# Patient Record
Sex: FEMALE | Race: WHITE | NOT HISPANIC OR LATINO | URBAN - METROPOLITAN AREA
[De-identification: names, ages, dates, MRNs, and addresses within clinical notes are randomized per-mention and may not be internally consistent; named-entity substitution may affect disease eponyms.]

---

## 2020-11-19 ENCOUNTER — WEB ENCOUNTER (OUTPATIENT)
Dept: URBAN - METROPOLITAN AREA CLINIC 13 | Facility: CLINIC | Age: 55
End: 2020-11-19

## 2020-12-08 ENCOUNTER — LAB OUTSIDE AN ENCOUNTER (OUTPATIENT)
Dept: URBAN - METROPOLITAN AREA CLINIC 70 | Facility: CLINIC | Age: 55
End: 2020-12-08

## 2020-12-08 ENCOUNTER — DASHBOARD ENCOUNTERS (OUTPATIENT)
Age: 55
End: 2020-12-08

## 2020-12-08 ENCOUNTER — WEB ENCOUNTER (OUTPATIENT)
Dept: URBAN - METROPOLITAN AREA CLINIC 70 | Facility: CLINIC | Age: 55
End: 2020-12-08

## 2020-12-08 ENCOUNTER — OFFICE VISIT (OUTPATIENT)
Dept: URBAN - METROPOLITAN AREA CLINIC 70 | Facility: CLINIC | Age: 55
End: 2020-12-08
Payer: COMMERCIAL

## 2020-12-08 DIAGNOSIS — R13.10 DYSPHAGIA: ICD-10-CM

## 2020-12-08 DIAGNOSIS — K21.9 GERD: ICD-10-CM

## 2020-12-08 DIAGNOSIS — Z80.0 FAMILY HISTORY OF COLON CANCER: ICD-10-CM

## 2020-12-08 DIAGNOSIS — Z85.3 PERSONAL HISTORY OF BREAST CANCER: ICD-10-CM

## 2020-12-08 PROBLEM — 40739000 DYSPHAGIA: Status: ACTIVE | Noted: 2020-12-08

## 2020-12-08 PROBLEM — 235595009 GASTROESOPHAGEAL REFLUX DISEASE: Status: ACTIVE | Noted: 2020-12-08

## 2020-12-08 PROBLEM — 312824007 FAMILY HISTORY OF CANCER OF COLON (SITUATION): Status: ACTIVE | Noted: 2020-12-08

## 2020-12-08 PROBLEM — 415076002 PERSONAL HISTORY OF PRIMARY MALIGNANT NEOPLASM OF BREAST: Status: ACTIVE | Noted: 2020-12-08

## 2020-12-08 PROCEDURE — 3017F COLORECTAL CA SCREEN DOC REV: CPT | Performed by: NURSE PRACTITIONER

## 2020-12-08 PROCEDURE — 99203 OFFICE O/P NEW LOW 30 MIN: CPT | Performed by: NURSE PRACTITIONER

## 2020-12-08 PROCEDURE — 1036F TOBACCO NON-USER: CPT | Performed by: NURSE PRACTITIONER

## 2020-12-08 PROCEDURE — G8427 DOCREV CUR MEDS BY ELIG CLIN: HCPCS | Performed by: NURSE PRACTITIONER

## 2020-12-08 PROCEDURE — G8417 CALC BMI ABV UP PARAM F/U: HCPCS | Performed by: NURSE PRACTITIONER

## 2020-12-08 RX ORDER — LORAZEPAM 1 MG/1
(SCHEDULE IV DRUG) TABLET ORAL
Qty: 60 UNSPECIFIED | Status: ACTIVE | COMMUNITY

## 2020-12-08 RX ORDER — PANTOPRAZOLE SODIUM 40 MG/1
1 TABLET TABLET, DELAYED RELEASE ORAL ONCE A DAY
Qty: 90 | Refills: 0 | OUTPATIENT
Start: 2020-12-08

## 2020-12-08 RX ORDER — APIXABAN 5 MG/1
TABLET, FILM COATED ORAL
Qty: 60 UNSPECIFIED | Status: ACTIVE | COMMUNITY

## 2020-12-08 RX ORDER — ONDANSETRON HYDROCHLORIDE 8 MG/1
TABLET, FILM COATED ORAL
Qty: 30 UNSPECIFIED | Status: ACTIVE | COMMUNITY

## 2020-12-08 RX ORDER — MELATONIN 5 MG
1 TABLET IN THE EVENING TABLET ORAL ONCE A DAY
Qty: 30 | Status: ACTIVE | COMMUNITY
Start: 2020-12-08

## 2020-12-08 RX ORDER — CAPECITABINE 500 MG/1
TABLET, FILM COATED ORAL
Qty: 84 UNSPECIFIED | Status: ACTIVE | COMMUNITY

## 2020-12-08 RX ORDER — GABAPENTIN 600 MG/1
TABLET, FILM COATED ORAL
Qty: 90 UNSPECIFIED | Status: ACTIVE | COMMUNITY

## 2020-12-08 RX ORDER — FLUTICASONE PROPIONATE AND SALMETEROL 50; 100 UG/1; UG/1
1 PUFF POWDER RESPIRATORY (INHALATION) TWICE A DAY
Status: ACTIVE | COMMUNITY
Start: 2020-12-08

## 2020-12-08 RX ORDER — CITALOPRAM HYDROBROMIDE 40 MG/1
TABLET ORAL
Qty: 30 UNSPECIFIED | Status: ACTIVE | COMMUNITY

## 2020-12-08 RX ORDER — ACETAMINOPHEN 325 MG/1
1 TABLET AS NEEDED TABLET, FILM COATED ORAL
Status: ACTIVE | COMMUNITY
Start: 2020-12-08

## 2020-12-08 NOTE — HPI-TODAY'S VISIT:
Pt is a 55 yr old female whom presents for an EGD with dilation as a referral from Dr Bradshaw. Pt has a medical hx significant for breast cancer with bone mets since 2016.  Has hx of chemotherapy. Today pt reports difficulty with swallowing solids which began 6-8 mo ago.  States she has hx of EGD with dilation in 2010.   Pt has not been taking an acid reducer.  States she does have occasional heartburn and used to take Nexium, but stopped taking it over 1yr ago.   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 Has hx of radiation to right side of her neck as well.  Takes Xeloda-chemotherapy.  Last had radiation in 2017. PET scan 10/28/20 showed area of increased focus in activity to rectosigmoid colon which is more prominent than previous PET. She is managed per [facility] regarding her cancer.  Reports sister with hx of colon cancer.  Denies abdominal pain, rectal bleeding, or abnormal wt loss.  Last colonoscopy >5 yrs ago and normal per pt.

## 2020-12-18 ENCOUNTER — TELEPHONE ENCOUNTER (OUTPATIENT)
Dept: URBAN - METROPOLITAN AREA CLINIC 70 | Facility: CLINIC | Age: 55
End: 2020-12-18

## 2025-06-03 ENCOUNTER — P2P PATIENT RECORD (OUTPATIENT)
Age: 60
End: 2025-06-03

## 2025-06-16 ENCOUNTER — OFFICE VISIT (OUTPATIENT)
Dept: URBAN - METROPOLITAN AREA CLINIC 70 | Facility: CLINIC | Age: 60
End: 2025-06-16
Payer: COMMERCIAL

## 2025-06-16 ENCOUNTER — LAB OUTSIDE AN ENCOUNTER (OUTPATIENT)
Dept: URBAN - METROPOLITAN AREA CLINIC 70 | Facility: CLINIC | Age: 60
End: 2025-06-16

## 2025-06-16 DIAGNOSIS — Z80.0 FAMILY HISTORY OF COLON CANCER: ICD-10-CM

## 2025-06-16 DIAGNOSIS — Z12.11 COLON CANCER SCREENING: ICD-10-CM

## 2025-06-16 PROCEDURE — 99202 OFFICE O/P NEW SF 15 MIN: CPT | Performed by: NURSE PRACTITIONER

## 2025-06-16 RX ORDER — LEVOTHYROXINE SODIUM 75 UG/1
1 TABLET IN THE MORNING ON AN EMPTY STOMACH TABLET ORAL ONCE A DAY
Status: ACTIVE | COMMUNITY

## 2025-06-16 RX ORDER — FOLIC ACID 1 MG/1
1 TABLET TABLET ORAL ONCE A DAY
Status: ACTIVE | COMMUNITY

## 2025-06-16 RX ORDER — GABAPENTIN 600 MG/1
1 TABLET TABLET, FILM COATED ORAL ONCE A DAY
Status: ACTIVE | COMMUNITY

## 2025-06-16 RX ORDER — ONDANSETRON 8 MG/1
1 TABLET ON THE TONGUE AND ALLOW TO DISSOLVE  AS NEEDED TABLET, ORALLY DISINTEGRATING ORAL ONCE A DAY
Status: ACTIVE | COMMUNITY

## 2025-06-16 RX ORDER — ACETAMINOPHEN 325 MG/1
1 TABLET AS NEEDED TABLET, FILM COATED ORAL
Status: DISCONTINUED | COMMUNITY
Start: 2020-12-08

## 2025-06-16 RX ORDER — MELATONIN 5 MG
1 TABLET IN THE EVENING TABLET ORAL ONCE A DAY
Qty: 30 | Status: DISCONTINUED | COMMUNITY
Start: 2020-12-08

## 2025-06-16 RX ORDER — ONDANSETRON HYDROCHLORIDE 8 MG/1
TABLET, FILM COATED ORAL
Qty: 30 UNSPECIFIED | Status: DISCONTINUED | COMMUNITY

## 2025-06-16 RX ORDER — LORAZEPAM 1 MG/1
1 TABLET AT BEDTIME AS NEEDED TABLET ORAL ONCE A DAY
Status: ACTIVE | COMMUNITY

## 2025-06-16 RX ORDER — POTASSIUM CHLORIDE 1.5 G/1.58G
1 PACKET WITH FOOD POWDER, FOR SOLUTION ORAL ONCE A DAY
Status: ACTIVE | COMMUNITY

## 2025-06-16 RX ORDER — CITALOPRAM HYDROBROMIDE 40 MG/1
TABLET ORAL
Qty: 30 UNSPECIFIED | Status: DISCONTINUED | COMMUNITY

## 2025-06-16 RX ORDER — CAPECITABINE 500 MG/1
TABLET, FILM COATED ORAL
Qty: 84 UNSPECIFIED | Status: DISCONTINUED | COMMUNITY

## 2025-06-16 RX ORDER — PANTOPRAZOLE SODIUM 40 MG/1
1 TABLET TABLET, DELAYED RELEASE ORAL ONCE A DAY
Qty: 90 | Refills: 0 | Status: DISCONTINUED | COMMUNITY
Start: 2020-12-08

## 2025-06-16 RX ORDER — CITALOPRAM 40 MG/1
0.5 TABLET TABLET, FILM COATED ORAL ONCE A DAY
Status: ACTIVE | COMMUNITY

## 2025-06-16 RX ORDER — APIXABAN 5 MG/1
TABLET, FILM COATED ORAL
Qty: 60 UNSPECIFIED | Status: DISCONTINUED | COMMUNITY

## 2025-06-16 RX ORDER — LORAZEPAM 1 MG/1
(SCHEDULE IV DRUG) TABLET ORAL
Qty: 60 UNSPECIFIED | Status: DISCONTINUED | COMMUNITY

## 2025-06-16 RX ORDER — GABAPENTIN 600 MG/1
TABLET, FILM COATED ORAL
Qty: 90 UNSPECIFIED | Status: DISCONTINUED | COMMUNITY

## 2025-06-16 RX ORDER — FLUTICASONE PROPIONATE AND SALMETEROL 50; 100 UG/1; UG/1
1 PUFF POWDER RESPIRATORY (INHALATION) TWICE A DAY
Status: DISCONTINUED | COMMUNITY
Start: 2020-12-08

## 2025-06-16 NOTE — HPI-TODAY'S VISIT:
06/16/25: 59-year-old female presents for colon screening, Denies any diarrhea, constipation, unintentional weight loss, pain, bleeding. Admits to family history of colon cancer in her sister. Last colonoscopy was about 10 years ago in [place], [place], per patient results were normal with a 10-year recall.

## 2025-07-02 ENCOUNTER — CLAIMS CREATED FROM THE CLAIM WINDOW (OUTPATIENT)
Dept: URBAN - METROPOLITAN AREA SURGERY CENTER 24 | Facility: SURGERY CENTER | Age: 60
End: 2025-07-02
Payer: COMMERCIAL

## 2025-07-02 ENCOUNTER — CLAIMS CREATED FROM THE CLAIM WINDOW (OUTPATIENT)
Dept: URBAN - METROPOLITAN AREA CLINIC 4 | Facility: CLINIC | Age: 60
End: 2025-07-02
Payer: COMMERCIAL

## 2025-07-02 DIAGNOSIS — Z12.11 COLON CANCER SCREENING: ICD-10-CM

## 2025-07-02 DIAGNOSIS — D12.3 ADENOMA OF TRANSVERSE COLON: ICD-10-CM

## 2025-07-02 DIAGNOSIS — D12.3 BENIGN NEOPLASM OF TRANSVERSE COLON: ICD-10-CM

## 2025-07-02 DIAGNOSIS — K63.5 COLONIC POLYPS: ICD-10-CM

## 2025-07-02 DIAGNOSIS — D12.8 ADENOMATOUS POLYP OF RECTUM: ICD-10-CM

## 2025-07-02 DIAGNOSIS — G47.33 OBSTRUCTIVE SLEEP APNEA: ICD-10-CM

## 2025-07-02 DIAGNOSIS — Z80.0 BROTHER AT YOUNG AGE FAMILY HISTORY OF COLON CANCER: ICD-10-CM

## 2025-07-02 DIAGNOSIS — D12.4 ADENOMA OF DESCENDING COLON: ICD-10-CM

## 2025-07-02 DIAGNOSIS — D12.4 BENIGN NEOPLASM OF DESCENDING COLON: ICD-10-CM

## 2025-07-02 DIAGNOSIS — D12.8 BENIGN NEOPLASM OF RECTUM: ICD-10-CM

## 2025-07-02 DIAGNOSIS — Z12.11 ENCOUNTER FOR SCREENING FOR MALIGNANT NEOPLASM OF COLON: ICD-10-CM

## 2025-07-02 PROCEDURE — 88305 TISSUE EXAM BY PATHOLOGIST: CPT | Performed by: PATHOLOGY

## 2025-07-02 PROCEDURE — 00811 ANES LWR INTST NDSC NOS: CPT | Performed by: NURSE ANESTHETIST, CERTIFIED REGISTERED

## 2025-07-02 PROCEDURE — 45385 COLONOSCOPY W/LESION REMOVAL: CPT | Performed by: INTERNAL MEDICINE

## 2025-07-23 ENCOUNTER — OFFICE VISIT (OUTPATIENT)
Dept: URBAN - METROPOLITAN AREA CLINIC 70 | Facility: CLINIC | Age: 60
End: 2025-07-23
Payer: COMMERCIAL

## 2025-07-23 DIAGNOSIS — Z86.0101 HISTORY OF ADENOMATOUS POLYP OF COLON: ICD-10-CM

## 2025-07-23 PROCEDURE — 99213 OFFICE O/P EST LOW 20 MIN: CPT | Performed by: NURSE PRACTITIONER

## 2025-07-23 RX ORDER — LORAZEPAM 1 MG/1
1 TABLET AT BEDTIME AS NEEDED TABLET ORAL ONCE A DAY
Status: ACTIVE | COMMUNITY

## 2025-07-23 RX ORDER — LEVOTHYROXINE SODIUM 75 UG/1
1 TABLET IN THE MORNING ON AN EMPTY STOMACH TABLET ORAL ONCE A DAY
Status: ACTIVE | COMMUNITY

## 2025-07-23 RX ORDER — FOLIC ACID 1 MG/1
1 TABLET TABLET ORAL ONCE A DAY
Status: ACTIVE | COMMUNITY

## 2025-07-23 RX ORDER — ONDANSETRON 8 MG/1
1 TABLET ON THE TONGUE AND ALLOW TO DISSOLVE  AS NEEDED TABLET, ORALLY DISINTEGRATING ORAL ONCE A DAY
Status: ACTIVE | COMMUNITY

## 2025-07-23 RX ORDER — CITALOPRAM 40 MG/1
0.5 TABLET TABLET, FILM COATED ORAL ONCE A DAY
Status: ACTIVE | COMMUNITY

## 2025-07-23 RX ORDER — POTASSIUM CHLORIDE 1.5 G/1.58G
1 PACKET WITH FOOD POWDER, FOR SOLUTION ORAL ONCE A DAY
Status: ACTIVE | COMMUNITY

## 2025-07-23 RX ORDER — GABAPENTIN 600 MG/1
1 TABLET TABLET, FILM COATED ORAL ONCE A DAY
Status: ACTIVE | COMMUNITY

## 2025-07-23 NOTE — HPI-TODAY'S VISIT:
07/23/25: 59 year old female presents for colonoscopy follow up. Denies any GI complaints today  07/02/25: Colon - 6 m polyp in TC, 6 mm polyp in DC, 5 mm polyp in rectum, diverticula in SC. Path - tubular adenoma. 3 year recall

## 2025-07-23 NOTE — HPI-OTHER HISTORIES
06/16/25:  59-year-old female presents for colon screening, Denies any diarrhea, constipation, unintentional weight loss, pain, bleeding. Admits to family history of colon cancer in her sister. Last colonoscopy was about 10 years ago in [place], [place], per patient results were normal with a 10-year recall.